# Patient Record
Sex: FEMALE | Race: BLACK OR AFRICAN AMERICAN
[De-identification: names, ages, dates, MRNs, and addresses within clinical notes are randomized per-mention and may not be internally consistent; named-entity substitution may affect disease eponyms.]

---
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
## 2020-05-27 ENCOUNTER — HOSPITAL ENCOUNTER (EMERGENCY)
Dept: HOSPITAL 97 - ER | Age: 20
LOS: 1 days | Discharge: HOME | End: 2020-05-28
Payer: COMMERCIAL

## 2020-05-27 DIAGNOSIS — H66.91: Primary | ICD-10-CM

## 2020-05-27 PROCEDURE — 87804 INFLUENZA ASSAY W/OPTIC: CPT

## 2020-05-27 PROCEDURE — 81025 URINE PREGNANCY TEST: CPT

## 2020-05-27 PROCEDURE — 87070 CULTURE OTHR SPECIMN AEROBIC: CPT

## 2020-05-27 PROCEDURE — 99283 EMERGENCY DEPT VISIT LOW MDM: CPT

## 2020-05-27 PROCEDURE — 93005 ELECTROCARDIOGRAM TRACING: CPT

## 2020-05-27 PROCEDURE — 81015 MICROSCOPIC EXAM OF URINE: CPT

## 2020-05-27 PROCEDURE — 71045 X-RAY EXAM CHEST 1 VIEW: CPT

## 2020-05-27 PROCEDURE — 96372 THER/PROPH/DIAG INJ SC/IM: CPT

## 2020-05-27 PROCEDURE — 81003 URINALYSIS AUTO W/O SCOPE: CPT

## 2020-05-27 PROCEDURE — 87086 URINE CULTURE/COLONY COUNT: CPT

## 2020-05-27 PROCEDURE — 87081 CULTURE SCREEN ONLY: CPT

## 2020-05-27 PROCEDURE — 87088 URINE BACTERIA CULTURE: CPT

## 2020-05-28 VITALS — OXYGEN SATURATION: 100 %

## 2020-05-28 VITALS — SYSTOLIC BLOOD PRESSURE: 136 MMHG | DIASTOLIC BLOOD PRESSURE: 70 MMHG | TEMPERATURE: 101.8 F

## 2020-05-28 LAB — UA COMPLETE W REFLEX CULTURE PNL UR: (no result)

## 2020-05-28 NOTE — RAD REPORT
EXAM DESCRIPTION:  Mayra Single View5/27/2020 10:36 pm

 

CLINICAL HISTORY:  Fever

 

COMPARISON:  2015

 

FINDINGS:   The lungs appear clear of acute infiltrate. The heart is normal size

 

IMPRESSION:   No acute abnormalities displayed

## 2020-05-30 NOTE — EKG
Test Date:    2020-05-27               Test Time:    22:49:49

Technician:   GIOVANI                                     

                                                     

MEASUREMENT RESULTS:                                       

Intervals:                                           

Rate:         107                                    

CO:           140                                    

QRSD:         80                                     

QT:           314                                    

QTc:          419                                    

Axis:                                                

P:            59                                     

CO:           140                                    

QRS:          58                                     

T:            46                                     

                                                     

INTERPRETIVE STATEMENTS:                                       

                                                     

Sinus tachycardia

Otherwise normal ECG

Compared to ECG 10/20/2015 20:27:50

Sinus rhythm no longer present



Electronically Signed On 05-30-20 13:03:28 CDT by Molina Silverman

## 2020-06-01 NOTE — ER
Nurse's Notes                                                                                     

 [facility]                                                                 

Name: Vivian Hull                                                                             

Age: 19 yrs                                                                                       

Sex: Female                                                                                       

: 2000                                                                                   

MRN: [id number]                                                                                   

Arrival Date: 2020                                                                          

Time: 21:36                                                                                       

Account#: [id number]                                                                            

Bed 20                                                                                            

Private MD:                                                                                       

Diagnosis: Fever, unspecified;Otitis media, unspecified, right ear                                

                                                                                                  

Presentation:                                                                                     

                                                                                             

21:49 Chief complaint: Patient states: "I've been having a fever and my tonsils are swollen   lp1 

      and I have a headache"; patient states headache since 20; Fever that began             

      yesterday, temp of 104 today; Last took Ibuprofen and tylenol at about 1400.                

      Coronavirus screen: Patient denies a cough. Patient denies shortness of breath or           

      difficulty breathing. Patient reports a measured and/or subjective temperature greater      

      than 100.4F. Patient denies travel on a cruise ship or to a country the [place] currently       

      lists as an affected area. Patient denies contact with known and/or suspected case of       

      COVID-19. Ebola Screen: No symptoms or risks identified at this time. Initial Sepsis        

      Screen: Does the patient meet any 2 criteria? Temp <36.0*C (96.8*F)) or > 38.3*C            

      (100.9*F). HR > 90 bpm. Does the patient have a suspected source of infection? Yes:         

      Other: Unknown. Risk Assessment: Do you want to hurt yourself or someone else? Patient      

      reports no desire to harm self or others. Note Patient states having a tooth fall out a     

      couple days ago, "a really bad cavity". Onset of symptoms was May 17, 2020.                 

21:49 Method Of Arrival: Ambulatory                                                           lp1 

21:49 Acuity: ARIELLE 3                                                                           lp1 

                                                                                                  

OB/GYN:                                                                                           

21:56 LMP N/A - Irregular menses                                                              lp1 

                                                                                                  

Historical:                                                                                       

- Allergies:                                                                                      

21:56 No Known Allergies;                                                                     lp1 

- Home Meds:                                                                                      

21:56 None [Active];                                                                          lp1 

- PMHx:                                                                                           

21:56 None;                                                                                   lp1 

- PSHx:                                                                                           

21:56 None;                                                                                   lp1 

                                                                                                  

- Immunization history:: Adult Immunizations up to date.                                          

- Social history:: Smoking status: Patient denies any tobacco usage or history of.                

                                                                                                  

                                                                                                  

Screenin/28                                                                                             

00:00 Abuse screen: Denies threats or abuse. Nutritional screening: No deficits noted.        vc  

      Tuberculosis screening: No symptoms or risk factors identified. Fall Risk None              

      identified.                                                                                 

                                                                                                  

Assessment:                                                                                       

                                                                                             

22:29 General: Appears uncomfortable, Behavior is anxious. Pain: Complains of pain in throat  ll1 

      Quality of pain is described as aching, Pain began 2-3 days ago. Neuro: No deficits         

      noted. Cardiovascular: No deficits noted. EENT: Throat is reddened has enlarged tonsils     

      Reports difficulty swallowing nasal congestion pain when swallowing in both ears.           

22:37 Reassessment: Spoke with patient's mother, Evelyn; Updated on plan of care for patient; lp1 

      Mother states she will call patient.                                                        

23:26 Reassessment: No changes from previously documented assessment. Patient and/or family   ll1 

      updated on plan of care and expected duration. Pain level reassessed. Patient is alert,     

      oriented x 3, equal unlabored respirations, skin warm/dry/pink.                             

23:57 Reassessment: No changes from previously documented assessment. Patient and/or family   ll1 

      updated on plan of care and expected duration. Pain level reassessed. Patient is alert,     

      oriented x 3, equal unlabored respirations, skin warm/dry/pink. Gait steady to restroom     

      for UA collection.                                                                          

                                                                                             

01:00 Reassessment: Patient appears in no apparent distress at this time. Patient and/or      vc  

      family updated on plan of care and expected duration. Pain level reassessed. Patient is     

      alert, oriented x 3, equal unlabored respirations, skin warm/dry/pink.                      

01:33 Reassessment: Patient appears in no apparent distress at this time. Patient and/or      vc  

      family updated on plan of care and expected duration. Pain level reassessed. Patient is     

      alert, oriented x 3, equal unlabored respirations, skin warm/dry/pink. Patient states       

      feeling better. Patient states symptoms have improved.                                      

                                                                                                  

Vital Signs:                                                                                      

                                                                                             

21:49  / 73; Pulse 109; Resp 18; Temp 103.7(O); Pulse Ox 100% on R/A; Weight 95.71 kg   lp1 

      (R); Height 5 ft. 7 in. (170.18 cm); Pain 9/10;                                             

22:30  / 83; Pulse 104; Resp 18; Pulse Ox 95% ;                                         ll1 

23:25  / 74; Pulse 102; Resp 17; Temp 103.2; Pulse Ox 100% ;                            ll1 

                                                                                             

01:33  / 70; Pulse 99; Resp 18; Temp 101.8; Pulse Ox 100% on R/A;                       vc  

                                                                                             

21:49 Body Mass Index 33.05 (95.71 kg, 170.18 cm)                                             lp1 

                                                                                                  

ED Course:                                                                                        

                                                                                             

21:36 Patient arrived in ED.                                                                  cf2 

21:53 [facility] Duran [facility]C [facility]                                                        snw 

21:53 Huber Benjamin MD is Attending Physician.                                           snw 

21:56 Triage completed.                                                                       lp1 

21:57 Prerna Felipe, RN is Primary Nurse.                                                     ll1 

21:57 Arm band placed on right wrist.                                                         lp1 

22:36 Chest Single View XRAY In Process Unspecified.                                          EDMS

                                                                                             

01:25 Primary Nurse role handed off by Prerna Felipe RN                                      vc  

01:25 Karla Joy RN is Primary Nurse.                                                  vc  

01:32 No provider procedures requiring assistance completed. Patient did not have IV access   vc  

      during this emergency room visit.                                                           

                                                                                                  

Administered Medications:                                                                         

                                                                                             

22:25 Drug: Tylenol 1000 mg Route: PO;                                                        ll1 

23:47 Follow up: Response: No adverse reaction; Temperature is decreased; RASS: Alert and     ll1 

      Calm (0)                                                                                    

                                                                                             

01:39 Follow up: Response: No adverse reaction; Temperature is decreased                      vc  

00:32 Drug: Bicillin L-A 1.2 million units Route: IM; Site: Ventrogluteal RIGHT;              vc  

01:34 Follow up: Response: No adverse reaction                                                vc  

00:41 Drug: Motrin 600 mg Route: PO;                                                          vc  

01:34 Follow up: Response: No adverse reaction                                                vc  

01:35 Not Given (Patient Refused): NS 0.9% (30 ml/kg) 30 ml/kg IV at bolus once; Sepsis       vc  

      Protocol                                                                                    

                                                                                                  

                                                                                                  

Outcome:                                                                                          

00:39 Discharge ordered by MD.                                                                snw 

01:32 Discharged to home                                                                      vc  

01:32 Condition: good                                                                             

01:32 Discharge instructions given to patient, Instructed on discharge instructions, follow       

      up and referral plans. Demonstrated understanding of instructions, follow-up care.          

01:35 Patient left the ED.                                                                    vc  

                                                                                                  

Signatures:                                                                                       

Dispatcher [employer]                           EDMS                                                 

Juliette Duran, REY-ISSAC                 FNP-Csnw                                                  

Nidia Hanks RN                         RN   lp1                                                  

Itzel Saunders                             cf2                                                  

Calcote, Karla, RN                    RN   vc                                                   

Matteo, Prerna, RN                       RN   ll1                                                  

                                                                                                  

**************************************************************************************************

## 2020-06-01 NOTE — EDPHYS
Physician Documentation                                                                           

 [facility]                                                                 

Name: Vivian Hull                                                                             

Age: 19 yrs                                                                                       

Sex: Female                                                                                       

: 2000                                                                                   

MRN: [id number]                                                                                   

Arrival Date: 2020                                                                          

Time: 21:36                                                                                       

Account#: [id number]                                                                            

Bed 20                                                                                            

Private MD:                                                                                       

ED Physician Huber Benjamin                                                                    

HPI:                                                                                              

                                                                                             

22:34 This 19 yrs old Black Female presents to ER via Ambulatory with complaints of Fever,    snw 

      Ear Pain, Sore Throat.                                                                      

22:34 The patient reports fever, that was measured at 104 degrees Fahrenheit. Onset: The      snw 

      symptoms/episode began/occurred gradually, 10 day(s) ago, and became worse today, fever     

      began today. Associated signs and symptoms: Pertinent positives: chills, earache, sore      

      throat. Severity of symptoms: At their worst the symptoms were moderate in the              

      emergency department the symptoms are unchanged. The patient has not experienced            

      similar symptoms in the past. recent tooth extraction.                                      

                                                                                                  

OB/GYN:                                                                                           

21:56 LMP N/A - Irregular menses                                                              lp1 

                                                                                                  

Historical:                                                                                       

- Allergies:                                                                                      

21:56 No Known Allergies;                                                                     lp1 

- Home Meds:                                                                                      

21:56 None [Active];                                                                          lp1 

- PMHx:                                                                                           

21:56 None;                                                                                   lp1 

- PSHx:                                                                                           

21:56 None;                                                                                   lp1 

                                                                                                  

- Immunization history:: Adult Immunizations up to date.                                          

- Social history:: Smoking status: Patient denies any tobacco usage or history of.                

                                                                                                  

                                                                                                  

ROS:                                                                                              

22:33 Eyes: Negative for injury, pain, redness, and discharge.                                snw 

22:33 Cardiovascular: Negative for chest pain, palpitations, and edema, Respiratory: Negative     

      for shortness of breath, cough, wheezing, and pleuritic chest pain, Abdomen/GI:             

      Negative for abdominal pain, nausea, vomiting, diarrhea, and constipation, Back:            

      Negative for injury and pain, : Negative for injury, bleeding, discharge, and             

      swelling, MS/Extremity: Negative for injury and deformity, Skin: Negative for injury,       

      rash, and discoloration, Neuro: Negative for headache, weakness, numbness, tingling,        

      and seizure, Psych: Negative for depression, anxiety, suicide ideation, homicidal           

      ideation, and hallucinations.                                                               

22:33 Constitutional: Positive for body aches, fatigue, fever.                                    

22:33 ENT: Positive for ear pain, sore throat.                                                    

22:33 Neck: Positive for swollen nodes, tenderness.                                               

                                                                                                  

Exam:                                                                                             

22:31 Head/Face:  Normocephalic, atraumatic. Eyes:  Pupils equal round and reactive to light, snw 

      extra-ocular motions intact.  Lids and lashes normal.  Conjunctiva and sclera are           

      non-icteric and not injected.  Cornea within normal limits.  Periorbital areas with no      

      swelling, redness, or edema.                                                                

22:31 Chest/axilla:  Normal chest wall appearance and motion.  Nontender with no deformity.       

      No lesions are appreciated.                                                                 

22:31 Respiratory:  Lungs have equal breath sounds bilaterally, clear to auscultation and         

      percussion.  No rales, rhonchi or wheezes noted.  No increased work of breathing, no        

      retractions or nasal flaring. Abdomen/GI:  Soft, non-tender, with normal bowel sounds.      

      No distension or tympany.  No guarding or rebound.  No evidence of tenderness               

      throughout. Back:  No spinal tenderness.  No costovertebral tenderness.  Full range of      

      motion. Skin:  Warm, dry with normal turgor.  Normal color with no rashes, no lesions,      

      and no evidence of cellulitis. MS/ Extremity:  Pulses equal, no cyanosis.                   

      Neurovascular intact.  Full, normal range of motion. Neuro:  Awake and alert, GCS 15,       

      oriented to person, place, time, and situation.  Cranial nerves II-XII grossly intact.      

      Motor strength 5/5 in all extremities.  Sensory grossly intact.  Cerebellar exam            

      normal.  Normal gait.                                                                       

22:31 Constitutional: The patient appears alert, awake, febrile.                                  

22:31 ENT: TM's: erythema, that is moderate, on the right, Examination of the other ear shows     

      no obvious abnormality, Nose: is normal, Mouth: is normal, Posterior pharynx: erythema,     

      that is mild, Voice: is normal.                                                             

22:31 Neck: Lymph nodes: lymphadenopathy is appreciated, anterior cervical nodes.                 

22:31 Cardiovascular: Rate: tachycardic, Rhythm: regular.                                         

22:31 Psych: Behavior/mood is cooperative, inappropriate for age, Affect is animated.             

                                                                                                  

Vital Signs:                                                                                      

21:49  / 73; Pulse 109; Resp 18; Temp 103.7(O); Pulse Ox 100% on R/A; Weight 95.71 kg   lp1 

      (R); Height 5 ft. 7 in. (170.18 cm); Pain 9/10;                                             

22:30  / 83; Pulse 104; Resp 18; Pulse Ox 95% ;                                         ll1 

23:25  / 74; Pulse 102; Resp 17; Temp 103.2; Pulse Ox 100% ;                            ll1 

                                                                                             

01:33  / 70; Pulse 99; Resp 18; Temp 101.8; Pulse Ox 100% on R/A;                       vc  

                                                                                             

21:49 Body Mass Index 33.05 (95.71 kg, 170.18 cm)                                             lp1 

                                                                                                  

MDM:                                                                                              

                                                                                             

22:12 Patient medically screened.                                                             snw 

                                                                                             

00:20 Data reviewed: vital signs, nurses notes. Data interpreted: Pulse oximetry: on room air snw 

      is 100 %. Interpretation: normal. Counseling: I had a detailed discussion with the          

      patient and/or guardian regarding: the historical points, exam findings, and any            

      diagnostic results supporting the discharge/admit diagnosis, lab results, radiology         

      results, the need for outpatient follow up, to return to the emergency department if        

      symptoms worsen or persist or if there are any questions or concerns that arise at          

      home. Refusal of service: The patient/guardian displays adequate decision making            

      capability and despite a detailed discussion of alternatives, benefits, risks, and          

      consequences refuses: pt refuses iv, ivf, will consent to Bicillin LA IM. EKG not           

      suggestive of endocarditis, flu and strep negative, but right otitis media. .               

00:40 Special discussion: Based on the history and exam findings, there is no indication for  snw 

      further emergent testing or inpatient evaluation. I discussed with the patient/guardian     

      the need to see the primary care provider for further evaluation of the symptoms.           

                                                                                                  

                                                                                             

22:04 Order name: Basic Metabolic Panel                                                       [facility] 

                                                                                             

22:04 Order name: Blood Culture Adult (2)                                                     w 

                                                                                             

22:04 Order name: CBC with Diff                                                               w 

                                                                                             

22:04 Order name: Urine Microscopic Only                                                      snw 

                                                                                             

22:04 Order name: Flu; Complete Time: 22:50                                                   snw 

                                                                                             

22:04 Order name: Strep; Complete Time: 22:50                                                 snw 

                                                                                             

22:04 Order name: Chest Single View XRAY                                                      [facility] 

                                                                                             

22:04 Order name: Cardiac monitoring; Complete Time: 23:46                                    snw 

                                                                                             

22:04 Order name: EKG - Nurse/Tech; Complete Time: 23:46                                      snw 

                                                                                             

22:04 Order name: O2 Per Protocol; Complete Time: 22:28                                       snw 

                                                                                             

22:04 Order name: O2 Sat Monitoring; Complete Time: 22:28                                     snw 

                                                                                             

23:07 Order name: Throat Culture                                                              [facility]

                                                                                             

00:08 Order name: Urine Dipstick--Ancillary (enter results)                                   [facility] 

                                                                                             

00:08 Order name: Urine Pregnancy--Ancillary (enter results)                                  [facility] 

                                                                                             

01:07 Order name: Urine Culture                                                               [facility]

                                                                                             

22:04 Order name: Urine Dipstick-Ancillary (obtain specimen); Complete Time: 23:47            snw 

                                                                                                  

EC/27                                                                                             

22:50 Rate is 107 beats/min. Rhythm is regular. QRS Axis is Normal. MS interval is normal.    snw 

      QRS interval is normal. QT interval is normal. No Q waves. T waves are Normal. Clinical     

      impression: Sinus tachycardia.                                                              

                                                                                                  

Administered Medications:                                                                         

22:25 Drug: Tylenol 1000 mg Route: PO;                                                        ll1 

23:47 Follow up: Response: No adverse reaction; Temperature is decreased; RASS: Alert and     ll1 

      Calm (0)                                                                                    

                                                                                             

01:39 Follow up: Response: No adverse reaction; Temperature is decreased                      vc  

00:32 Drug: Bicillin L-A 1.2 million units Route: IM; Site: Ventrogluteal RIGHT;              vc  

01:34 Follow up: Response: No adverse reaction                                                vc  

00:41 Drug: Motrin 600 mg Route: PO;                                                          vc  

01:34 Follow up: Response: No adverse reaction                                                vc  

01:35 Not Given (Patient Refused): NS 0.9% (30 ml/kg) 30 ml/kg IV at bolus once; Sepsis       vc  

      Protocol                                                                                    

                                                                                                  

                                                                                                  

Disposition:                                                                                      

06:41 Co-signature as Attending Physician, Huber Benjamin MD.                               ma2 

                                                                                                  

Disposition:                                                                                      

20 00:39 Discharged to Home. Impression: Fever, unspecified, Otitis media, unspecified,     

  right ear.                                                                                      

- Condition is Stable.                                                                            

- Discharge Instructions: Otitis Media, Adult, Fever, Adult, Rehydration, Adult.                  

                                                                                                  

- Medication Reconciliation Form, Thank You Letter, Antibiotic Education, Prescription            

  Opioid Use form.                                                                                

- Follow up: Emergency Department; When: As needed; Reason: Worsening of condition.               

  Follow up: Private Physician; When: 1 - 2 days; Reason: Recheck today's complaints,             

  Continuance of care, Re-evaluation by your physician.                                           

                                                                                                  

                                                                                                  

                                                                                                  

Signatures:                                                                                       

Dispatcher Jalousier                           [facility]                                                 

Juliette Duran, FNP-C                 FNP-Csnw                                                  

Nidia Hanks, RN                         RN   lp1                                                  

Huber Benjamin MD MD   ma2                                                  

Karla Joy RN RN vc Lewis, Lynsay, RN                       RN   ll1                                                  

                                                                                                  

Corrections: (The following items were deleted from the chart)                                    

                                                                                             

22:44 22:04 IV Saline Lock - Large Bore ordered. [place] [id number] 

:44 22:04 Labs collected and sent ordered. [place] [id number] 

23:46 22:04 Accucheck ordered. [place] [id number] 

                                                                                             

01:35 00:39 2020 00:39 Discharged to Home. Impression: Fever, unspecified; Otitis       vc  

      media, unspecified, right ear. Condition is Stable. Discharge Instructions: Otitis          

      Media, Adult, Rehydration, Adult, Fever, Adult. Forms are Medication Reconciliation         

      Form, Thank You Letter, Antibiotic Education, Prescription Opioid Use. Follow up:           

      Emergency Department; When: As needed; Reason: Worsening of condition. Follow up:           

      Private Physician; When: 1 - 2 days; Reason: Recheck today's complaints, Continuance of     

      care, Re-evaluation by your physician. snw                                                  

                                                                                                  

**************************************************************************************************

## 2020-07-02 ENCOUNTER — HOSPITAL ENCOUNTER (EMERGENCY)
Dept: HOSPITAL 97 - ER | Age: 20
Discharge: HOME | End: 2020-07-02
Payer: COMMERCIAL

## 2020-07-02 VITALS — TEMPERATURE: 98 F | SYSTOLIC BLOOD PRESSURE: 133 MMHG | DIASTOLIC BLOOD PRESSURE: 82 MMHG | OXYGEN SATURATION: 100 %

## 2020-07-02 DIAGNOSIS — B37.9: ICD-10-CM

## 2020-07-02 DIAGNOSIS — N39.0: Primary | ICD-10-CM

## 2020-07-02 PROCEDURE — 81003 URINALYSIS AUTO W/O SCOPE: CPT

## 2020-07-02 PROCEDURE — 99283 EMERGENCY DEPT VISIT LOW MDM: CPT

## 2020-07-02 NOTE — EDPHYS
Physician Documentation                                                                           

 [facility]                                                                 

Name: Vivian Hull                                                                             

Age: 19 yrs                                                                                       

Sex: Female                                                                                       

: 2000                                                                                   

MRN: [id number]                                                                                   

Arrival Date: 2020                                                                          

Time: 03:40                                                                                       

Account#: [id number]                                                                            

Bed 5                                                                                             

Private MD:                                                                                       

ED Physician Jose Rivera                                                                      

HPI:                                                                                              

                                                                                             

04:00 This 19 yrs old Black Female presents to ER via Ambulatory with complaints of Vaginal   jaycee 

      Pain.                                                                                       

04:00 The patient presents with vaginal discharge, that is a small amount of a moderate       jaycee 

      amount of curd-like, white discharge. Onset: The symptoms/episode began/occurred 3          

      day(s) ago. Modifying factors: The symptoms are alleviated by nothing, the symptoms are     

      aggravated by nothing. Associated signs and symptoms: The patient has no apparent           

      associated signs or symptoms. Severity of symptoms: At their worst the symptoms were        

      mild, in the emergency department the symptoms are unchanged. The patient is not            

      sexually active. The patient has not experienced similar symptoms in the past.              

                                                                                                  

OB/GYN:                                                                                           

03:56 LMP 6/15/2020                                                                           ea  

                                                                                                  

Historical:                                                                                       

- Allergies:                                                                                      

03:56 No Known Allergies;                                                                     ea  

- Home Meds:                                                                                      

03:56 None [Active];                                                                          ea  

- PMHx:                                                                                           

03:56 None;                                                                                   ea  

- PSHx:                                                                                           

03:56 None;                                                                                   ea  

                                                                                                  

- Immunization history:: Adult Immunizations unknown.                                             

- Social history:: Smoking status: Patient denies any tobacco usage or history of.                

- Family history:: not pertinent.                                                                 

                                                                                                  

                                                                                                  

ROS:                                                                                              

04:00 Constitutional: Negative for fever, chills, and weight loss, Eyes: Negative for injury, jaycee 

      pain, redness, and discharge, ENT: Negative for injury, pain, and discharge, Neck:          

      Negative for injury, pain, and swelling, Cardiovascular: Negative for chest pain,           

      palpitations, and edema, Respiratory: Negative for shortness of breath, cough,              

      wheezing, and pleuritic chest pain, Abdomen/GI: Negative for abdominal pain, nausea,        

      vomiting, diarrhea, and constipation, Back: Negative for injury and pain, MS/Extremity:     

      Negative for injury and deformity, Skin: Negative for injury, rash, and discoloration,      

      Neuro: Negative for headache, weakness, numbness, tingling, and seizure, Psych:             

      Negative for depression, anxiety, suicide ideation, homicidal ideation, and                 

      hallucinations, Allergy/Immunology: Negative for hives, rash, and allergies, Endocrine:     

      Negative for neck swelling, polydipsia, polyuria, polyphagia, and marked weight             

      changes, Hematologic/Lymphatic: Negative for swollen nodes, abnormal bleeding, and          

      unusual bruising.                                                                           

04:00 : Positive for vaginal discharge, vaginal itching.                                        

                                                                                                  

Exam:                                                                                             

04:00 Constitutional:  This is a well developed, well nourished patient who is awake, alert,  jaycee 

      and in no acute distress. Head/Face:  Normocephalic, atraumatic. Eyes:  Pupils equal        

      round and reactive to light, extra-ocular motions intact.  Lids and lashes normal.          

      Conjunctiva and sclera are non-icteric and not injected.  Cornea within normal limits.      

      Periorbital areas with no swelling, redness, or edema. ENT:  Nares patent. No nasal         

      discharge, no septal abnormalities noted.  Tympanic membranes are normal and external       

      auditory canals are clear.  Oropharynx with no redness, swelling, or masses, exudates,      

      or evidence of obstruction, uvula midline.  Mucous membranes moist. Neck:  Trachea          

      midline, no thyromegaly or masses palpated, and no cervical lymphadenopathy.  Supple,       

      full range of motion without nuchal rigidity, or vertebral point tenderness.  No            

      Meningismus. Chest/axilla:  Normal chest wall appearance and motion.  Nontender with no     

      deformity.  No lesions are appreciated. Cardiovascular:  Regular rate and rhythm with a     

      normal S1 and S2.  No gallops, murmurs, or rubs.  Normal PMI, no JVD.  No pulse             

      deficits. Respiratory:  Lungs have equal breath sounds bilaterally, clear to                

      auscultation and percussion.  No rales, rhonchi or wheezes noted.  No increased work of     

      breathing, no retractions or nasal flaring. Abdomen/GI:  Soft, non-tender, with normal      

      bowel sounds.  No distension or tympany.  No guarding or rebound.  No evidence of           

      tenderness throughout. Back:  No spinal tenderness.  No costovertebral tenderness.          

      Full range of motion. Skin:  Warm, dry with normal turgor.  Normal color with no            

      rashes, no lesions, and no evidence of cellulitis. MS/ Extremity:  Pulses equal, no         

      cyanosis.  Neurovascular intact.  Full, normal range of motion. Neuro:  Awake and           

      alert, GCS 15, oriented to person, place, time, and situation.  Cranial nerves II-XII       

      grossly intact.  Motor strength 5/5 in all extremities.  Sensory grossly intact.            

      Cerebellar exam normal.  Normal gait. Psych:  Awake, alert, with orientation to person,     

      place and time.  Behavior, mood, and affect are within normal limits.                       

04:00 : Pelvic Exam: the exam is deferred, na.                                                  

                                                                                                  

Vital Signs:                                                                                      

03:52  / 82; Pulse 83; Resp 18; Temp 98; Pulse Ox 100% on R/A; Weight 95.71 kg; Height  ea  

      5 ft. 7 in. (170.18 cm);                                                                    

03:52 Body Mass Index 33.05 (95.71 kg, 170.18 cm)                                             ea  

                                                                                                  

MDM:                                                                                              

03:49 Patient medically screened.                                                             [facility] 

04:03 Data reviewed: vital signs, nurses notes, lab test result(s), urinalysis.               [facility] 

04:10 Differential diagnosis: nonspecific abdominal pain, urinary tract infection, vaginosis. [facility] 

      Data interpreted: Cardiac monitor: not applicable for this patient encounter. rate is       

      83 beats/min, rhythm is regular, Pulse oximetry: on is 100 %. Test interpretation: by       

      ED physician or midlevel provider:. Counseling: I had a detailed discussion with the        

      patient and/or guardian regarding: the historical points, exam findings, and any            

      diagnostic results supporting the discharge/admit diagnosis, the need for outpatient        

      follow up, for definitive care, an OB/Gyne specialist. ED course: no fear of retention      

      of a tampoon.                                                                               

                                                                                                  

                                                                                             

04:09 Order name: Urine Dipstick--Ancillary (enter results)                                     

                                                                                             

04:00 Order name: Urine Dipstick-Ancillary (obtain specimen); Complete Time: 04:04            [facility] 

                                                                                             

04:00 Order name: Urine Pregnancy Test (obtain specimen); Complete Time: 04:04                [facility] 

                                                                                                  

Administered Medications:                                                                         

04:09 Drug: DiFLUcan 200 mg Route: PO;                                                        ea  

04:14 Follow up: Response: No adverse reaction; Medication administered at discharge.         mg2 

04:13 Drug: Cipro 500 mg Route: PO;                                                           mg2 

04:14 Follow up: Response: No adverse reaction; Medication administered at discharge.         mg2 

                                                                                                  

                                                                                                  

Disposition:                                                                                      

20 04:12 Discharged to Home. Impression: Candidiasis, Urinary tract infection, site not     

  specified.                                                                                      

- Condition is Stable.                                                                            

- Discharge Instructions: Dysuria, Urinary Tract Infection, Adult, Vaginal Yeast                  

  Infection, Adult, Urinary Tract Infection, Adult, Easy-to-Read, Antibiotic Medicine,            

  Adult, Antibiotic Medicine, Easy-to-Read.                                                       

- Prescriptions for Cipro 250 mg Oral Tablet - take 1 tablet by ORAL route every 12               

  hours for 7 days; 14 tablet. Diflucan 150 mg Oral Tablet - take 1 tablet by ORAL                

  route one time for 1 day every thursday; 2 tablet.                                              

- Medication Reconciliation Form, Thank You Letter, Antibiotic Education, Prescription            

  Opioid Use form.                                                                                

- Follow up: Private Physician; When: 2 - 3 days; Reason: Recheck today's complaints,             

  Continuance of care, Re-evaluation by your physician. Follow up: Ozzy Jasmine;                

  When: 2 - 3 days; Reason: Recheck today's complaints, Re-evaluation by your physician.          

- Problem is new.                                                                                 

- Symptoms have improved.                                                                         

                                                                                                  

                                                                                                  

                                                                                                  

Signatures:                                                                                       

Dispatcher [employer]                           EDJose Saldivar MD MD cha Antunez, Elena, Fred Gupta RN, ea, RN RN   mg2                                                  

                                                                                                  

Corrections: (The following items were deleted from the chart)                                    

04:21 04:12 2020 04:12 Discharged to Home. Impression: Candidiasis; Urinary tract       mg2 

      infection, site not specified. Condition is Stable. Forms are Medication Reconciliation     

      Form, Thank You Letter, Antibiotic Education, Prescription Opioid Use. Follow up:           

      Private Physician; When: 2 - 3 days; Reason: Recheck today's complaints, Continuance of     

      care, Re-evaluation by your physician. Follow up: Ozzy Jasmine; When: 2 - 3 days;         

      Reason: Recheck today's complaints, Re-evaluation by your physician. Problem is new.        

      Symptoms have improved. jaycee                                                                 

                                                                                                  

**************************************************************************************************

## 2020-07-02 NOTE — ER
Nurse's Notes                                                                                     

 [facility]                                                                 

Name: Vivian Hull                                                                             

Age: 19 yrs                                                                                       

Sex: Female                                                                                       

: 2000                                                                                   

MRN: [id number]                                                                                   

Arrival Date: 2020                                                                          

Time: 03:40                                                                                       

Account#: [id number]                                                                            

Bed 5                                                                                             

Private MD:                                                                                       

Diagnosis: Candidiasis;Urinary tract infection, site not specified                                

                                                                                                  

Presentation:                                                                                     

                                                                                             

03:52 Chief complaint: Patient states: Reports she had a weird discharge that started a few   ea  

      days ago that looked clumpy. Pt reports at 0315 this AM she took monistat and it            

      started burning. Coronavirus screen: Proceed with normal triage. Ebola Screen: No           

      symptoms or risks identified at this time. Initial Sepsis Screen: Does the patient meet     

      any 2 criteria? No. Patient's initial sepsis screen is negative. Does the patient have      

      a suspected source of infection? No. Patient's initial sepsis screen is negative. Risk      

      Assessment: Do you want to hurt yourself or someone else? Patient reports no desire to      

      harm self or others. Onset of symptoms was 2020.                                   

03:52 Method Of Arrival: Ambulatory                                                           ea  

03:52 Acuity: ARIELLE 3                                                                           ea  

03:52 Acuity: ARIELLE 4                                                                           ea  

                                                                                                  

OB/GYN:                                                                                           

03:56 LMP 6/15/2020                                                                           ea  

                                                                                                  

Historical:                                                                                       

- Allergies:                                                                                      

03:56 No Known Allergies;                                                                     ea  

- Home Meds:                                                                                      

03:56 None [Active];                                                                          ea  

- PMHx:                                                                                           

03:56 None;                                                                                   ea  

- PSHx:                                                                                           

03:56 None;                                                                                   ea  

                                                                                                  

- Immunization history:: Adult Immunizations unknown.                                             

- Social history:: Smoking status: Patient denies any tobacco usage or history of.                

- Family history:: not pertinent.                                                                 

                                                                                                  

                                                                                                  

Screenin:52 Abuse screen: Denies threats or abuse. Nutritional screening: No deficits noted.        ea  

      Tuberculosis screening: No symptoms or risk factors identified. Fall Risk None              

      identified.                                                                                 

                                                                                                  

Assessment:                                                                                       

04:14 General: Appears in no apparent distress. comfortable, Behavior is calm, cooperative.   mg2 

      Pain: Complains of pain in vaginal area. Neuro: Level of Consciousness is awake, alert,     

      obeys commands, Oriented to person, place, time, Appropriate for age. Cardiovascular:       

      Capillary refill < 3 seconds Patient's skin is warm and dry. Respiratory: Airway is         

      patent Respiratory effort is even, unlabored, Respiratory pattern is regular,               

      symmetrical. GI: No signs and/or symptoms were reported involving the gastrointestinal      

      system. : Reports discharge, from vagina that is white, pain vaginal area. EENT: No       

      signs and/or symptoms were reported regarding the EENT system. Derm: Skin is intact, is     

      healthy with good turgor, Skin is pink, warm \T\ dry. normal. Musculoskeletal:              

      Circulation, motion, and sensation intact. Capillary refill < 3 seconds.                    

                                                                                                  

Vital Signs:                                                                                      

03:52  / 82; Pulse 83; Resp 18; Temp 98; Pulse Ox 100% on R/A; Weight 95.71 kg; Height  ea  

      5 ft. 7 in. (170.18 cm);                                                                    

03:52 Body Mass Index 33.05 (95.71 kg, 170.18 cm)                                             ea  

                                                                                                  

ED Course:                                                                                        

03:40 Patient arrived in ED.                                                                  bp1 

03:49 Jose Rivera MD is Attending Physician.                                             jaycee 

03:49 Fred Vasquez, RN is Primary Nurse.                                                  mg2 

03:55 Triage completed.                                                                       ea  

03:55 Arm band placed on right wrist. Patient placed in an exam room, on a stretcher, on      ea  

      pulse oximetry.                                                                             

03:55 Patient has correct armband on for positive identification. Bed in low position. Call   ea  

      light in reach. Side rails up X2.                                                           

04:12 Ozzy Jasmine MD is Referral Physician.                                              jaycee 

04:15 No provider procedures requiring assistance completed. Patient did not have IV access   mg2 

      during this emergency room visit.                                                           

                                                                                                  

Administered Medications:                                                                         

04:09 Drug: DiFLUcan 200 mg Route: PO;                                                        ea  

04:14 Follow up: Response: No adverse reaction; Medication administered at discharge.         mg2 

04:13 Drug: Cipro 500 mg Route: PO;                                                           mg2 

04:14 Follow up: Response: No adverse reaction; Medication administered at discharge.         mg2 

                                                                                                  

                                                                                                  

Outcome:                                                                                          

04:12 Discharge ordered by MD.                                                                jaycee 

04:20 Discharged to home ambulatory.                                                          mg2 

04:20 Condition: stable                                                                           

04:20 Discharge instructions given to patient, Instructed on discharge instructions, follow       

      up and referral plans. medication usage, Demonstrated understanding of instructions,        

      follow-up care, medications, Prescriptions given X 2.                                       

04:21 Patient left the ED.                                                                    mg2 

                                                                                                  

Signatures:                                                                                       

Jose Rivera MD MD cha Antunez, Elena, RN RN ea Gardose, Michele, RN RN   mg2                                                  

Tawny Kiser                           bp1                                                  

                                                                                                  

Corrections: (The following items were deleted from the chart)                                    

04:20 04:14 : Reports pain vaginal area mg2                                                 mg2 

                                                                                                  

**************************************************************************************************

## 2021-06-09 ENCOUNTER — HOSPITAL ENCOUNTER (EMERGENCY)
Dept: HOSPITAL 97 - ER | Age: 21
Discharge: HOME | End: 2021-06-09
Payer: COMMERCIAL

## 2021-06-09 VITALS — TEMPERATURE: 97.9 F | SYSTOLIC BLOOD PRESSURE: 144 MMHG | OXYGEN SATURATION: 100 % | DIASTOLIC BLOOD PRESSURE: 80 MMHG

## 2021-06-09 DIAGNOSIS — S00.90XA: Primary | ICD-10-CM
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
## 2021-06-09 PROCEDURE — 99283 EMERGENCY DEPT VISIT LOW MDM: CPT
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
## 2022-08-04 ENCOUNTER — HOSPITAL ENCOUNTER (EMERGENCY)
Dept: HOSPITAL 97 - ER | Age: 22
Discharge: HOME | End: 2022-08-04
Payer: SELF-PAY

## 2022-08-04 VITALS — DIASTOLIC BLOOD PRESSURE: 73 MMHG | OXYGEN SATURATION: 100 % | SYSTOLIC BLOOD PRESSURE: 130 MMHG | TEMPERATURE: 98.3 F

## 2022-08-04 DIAGNOSIS — N94.6: Primary | ICD-10-CM

## 2022-08-04 LAB
ALBUMIN SERPL BCP-MCNC: 3.8 G/DL (ref 3.4–5)
ALP SERPL-CCNC: 78 U/L (ref 45–117)
ALT SERPL W P-5'-P-CCNC: 13 U/L (ref 12–78)
AST SERPL W P-5'-P-CCNC: 12 U/L (ref 15–37)
BUN BLD-MCNC: 9 MG/DL (ref 7–18)
GLUCOSE SERPLBLD-MCNC: 91 MG/DL (ref 74–106)
HCT VFR BLD CALC: 39.5 % (ref 36–45)
LIPASE SERPL-CCNC: 77 U/L (ref 73–393)
LYMPHOCYTES # SPEC AUTO: 2.9 K/UL (ref 0.7–4.9)
MCV RBC: 79.1 FL (ref 80–100)
PMV BLD: 7.6 FL (ref 7.6–11.3)
POTASSIUM SERPL-SCNC: 4 MMOL/L (ref 3.5–5.1)
RBC # BLD: 4.99 M/UL (ref 3.86–4.86)
SP GR UR: 1.02 (ref 1–1.03)

## 2022-08-04 PROCEDURE — 36415 COLL VENOUS BLD VENIPUNCTURE: CPT

## 2022-08-04 PROCEDURE — 80053 COMPREHEN METABOLIC PANEL: CPT

## 2022-08-04 PROCEDURE — 83690 ASSAY OF LIPASE: CPT

## 2022-08-04 PROCEDURE — 96375 TX/PRO/DX INJ NEW DRUG ADDON: CPT

## 2022-08-04 PROCEDURE — 81025 URINE PREGNANCY TEST: CPT

## 2022-08-04 PROCEDURE — 81003 URINALYSIS AUTO W/O SCOPE: CPT

## 2022-08-04 PROCEDURE — 85025 COMPLETE CBC W/AUTO DIFF WBC: CPT

## 2022-08-04 PROCEDURE — 96374 THER/PROPH/DIAG INJ IV PUSH: CPT

## 2022-08-04 PROCEDURE — 99284 EMERGENCY DEPT VISIT MOD MDM: CPT

## 2022-08-04 PROCEDURE — 74177 CT ABD & PELVIS W/CONTRAST: CPT

## 2022-08-04 NOTE — ER
Nurse's Notes                                                                                     

 [facility]                                                                 

Name: Vivian Hull                                                                             

Age: 21 yrs                                                                                       

Sex: Female                                                                                       

: 2000                                                                                   

MRN: [id number]                                                                                   

Arrival Date: 2022                                                                          

Time: 17:39                                                                                       

Account#: [id number]                                                                            

Bed 24                                                                                            

Private MD:                                                                                       

Diagnosis: Dysmenorrhea, unspecified                                                              

                                                                                                  

Presentation:                                                                                     

                                                                                             

17:51 Chief complaint: Patient states: ABD cramping since this morning - nausea. Reports      ld1 

      menstrual cycle began this morning. Coronavirus screen: At this time, the client does       

      not indicate any symptoms associated with coronavirus-19. Ebola Screen: No symptoms or      

      risks identified at this time. Initial Sepsis Screen: Does the patient meet any 2           

      criteria? No. Patient's initial sepsis screen is negative. Does the patient have a          

      suspected source of infection? No. Patient's initial sepsis screen is negative. Risk        

      Assessment: Do you want to hurt yourself or someone else? Patient reports no desire to      

      harm self or others. Onset of symptoms was 2022 at 17:53.                        

17:51 Method Of Arrival: Ambulatory                                                           ld1 

17:51 Acuity: ARIELLE 3                                                                           ld1 

                                                                                                  

Triage Assessment:                                                                                

17:53 General: Appears in no apparent distress. uncomfortable, Behavior is cooperative,       ld1 

      anxious. Pain: Complains of pain in right lower quadrant and left lower quadrant Pain       

      does not radiate. Pain currently is 9 out of 10 on a pain scale. EENT: No signs and/or      

      symptoms were reported regarding the EENT system. Neuro: Level of Consciousness is          

      awake, alert, obeys commands, Oriented to person, place, time, situation.                   

      Cardiovascular: Capillary refill < 3 seconds Patient's skin is warm and dry.                

      Respiratory: Airway is patent Respiratory effort is even, unlabored. GI: Abdomen is         

      round non-distended, Reports cramping. : Urine is clear, Reports vaginal bleeding         

      that is. Derm: No signs and/or symptoms reported regarding the dermatologic system.         

      Musculoskeletal: No signs and/or symptoms reported regarding the musculoskeletal system.    

                                                                                                  

OB/GYN:                                                                                           

17:53 LMP 2022                                                                            ld1 

                                                                                                  

Historical:                                                                                       

- Allergies:                                                                                      

18:25 No Known Allergies;                                                                     hb  

- Home Meds:                                                                                      

17:53 None [Active];                                                                          ld1 

- PMHx:                                                                                           

17:53 None;                                                                                   ld1 

- PSHx:                                                                                           

17:53 None;                                                                                   ld1 

                                                                                                  

- Immunization history:: Adult Immunizations up to date, Client reports having NOT                

  received the Covid vaccine.                                                                     

- Social history:: Smoking status: Patient denies any tobacco usage or history of.                

  Patient/guardian denies using alcohol.                                                          

                                                                                                  

                                                                                                  

Screenin:34 Abuse screen: Denies threats or abuse. Denies injuries from another. Nutritional        hb  

      screening: No deficits noted. Tuberculosis screening: No symptoms or risk factors           

      identified. Fall Risk None identified.                                                      

                                                                                                  

Assessment:                                                                                       

18:25 General: Appears in no apparent distress. Behavior is calm, cooperative. Pain: Pain     hb  

      currently is 8 out of 10 on a pain scale. Neuro: Level of Consciousness is awake,           

      alert, obeys commands, Oriented to person, place, time, situation. Cardiovascular:          

      Patient's skin is warm and dry. Respiratory: Respiratory effort is even, unlabored,         

      Respiratory pattern is regular, symmetrical. GI: Reports lower abdominal pain. :          

      Reports vaginal bleeding that is. EENT: No signs and/or symptoms were reported              

      regarding the EENT system. Derm: Skin is pink, warm \T\ dry. Musculoskeletal: No signs      

      and/or symptoms reported regarding the musculoskeletal system.                              

20:34 Reassessment: Patient appears in no apparent distress at this time. No changes from     hb  

      previously documented assessment. Patient and/or family updated on plan of care and         

      expected duration. Pain level reassessed. Patient is alert, oriented x 3, equal             

      unlabored respirations, skin warm/dry/pink.                                                 

                                                                                                  

Vital Signs:                                                                                      

17:51  / 73; Pulse 73; Resp 18; Temp 98.3(TE); Pulse Ox 100% on R/A; Weight 97.52 kg;   ld1 

      Height 5 ft. 7 in. (170.18 cm); Pain 9/10;                                                  

17:51 Body Mass Index 33.67 (97.52 kg, 170.18 cm)                                             ld1 

                                                                                                  

ED Course:                                                                                        

17:39 Patient arrived in ED.                                                                  rg4 

17:41 [facility]                                                        kb  

17:41 Carlos Parks MD is Attending Physician.                                              kb  

17:53 Triage completed.                                                                       ld1 

17:53 Arm band placed on right wrist.                                                         ld1 

17:59 Abbie Galo, RN is Primary Nurse.                                                   hb  

18:14 Inserted saline lock: 22 gauge in right antecubital area, using aseptic technique.      zm  

      Blood collected.                                                                            

18:15 CBC with Diff Sent.                                                                     zm  

18:15 CMP Sent.                                                                               zm  

18:15 Lipase Sent.                                                                            zm  

19:21 CT Abd/Pelvis - IV Contrast Only In Process Unspecified.                                EDMS

19:36 Bed in low position. Call light in reach. Side rails up X2. Assisted to bathroom.       3 

20:34 No provider procedures requiring assistance completed. IV discontinued, intact,         hb  

      bleeding controlled, No redness/swelling at site.                                           

                                                                                                  

Administered Medications:                                                                         

18:25 Drug: NS 0.9% 1000 ml Route: IV; Rate: 1 bolus; Site: right antecubital;                hb  

18:25 Drug: Zofran (Ondansetron) 4 mg Route: IVP; Site: right antecubital;                    hb  

18:25 Drug: Ketorolac 15 mg Route: IVP; Site: right antecubital;                              hb  

                                                                                                  

                                                                                                  

Medication:                                                                                       

20:34 VIS not applicable for this client.                                                     hb  

                                                                                                  

Outcome:                                                                                          

20:09 Discharge ordered by MD.                                                                kb  

20:10 Discharge ordered by MD.                                                                kb  

20:34 Discharged to home ambulatory.                                                          hb  

20:34 Condition: stable                                                                           

20:34 Discharge instructions given to patient, Instructed on discharge instructions, follow       

      up and referral plans. medication usage, Demonstrated understanding of instructions,        

      follow-up care, medications.                                                                

20:35 Patient left the ED.                                                                    hb  

                                                                                                  

Signatures:                                                                                       

Dispatcher [employer]                           EDMS                                                 

Senait Dwyer, FNP-C                 FNP-CkAbbie Coronado, RN                     RN   Minal Rudolph                                 4                                                  

Rosaura Johnson RN                     RN   1                                                  

Lali Mulligan                                   3                                                  

Diandra Wyatt                                                                                 

                                                                                                  

**************************************************************************************************

## 2022-08-04 NOTE — EDPHYS
Physician Documentation                                                                           

 [facility]                                                                 

Name: Vivian Hull                                                                             

Age: 21 yrs                                                                                       

Sex: Female                                                                                       

: 2000                                                                                   

MRN: [id number]                                                                                   

Arrival Date: 2022                                                                          

Time: 17:39                                                                                       

Account#: [id number]                                                                            

Bed 24                                                                                            

Private MD:                                                                                       

ED Physician Carlos Parks                                                                       

HPI:                                                                                              

                                                                                             

00:30 This 21 yrs old Black Female presents to ER via Ambulatory with complaints of Abdominal kb  

      Cramping, Vaginal Bleeding.                                                                 

00:30 The patient presents with abdominal pain in the lower abdomen. Onset: The               kb  

      symptoms/episode began/occurred this morning. The symptoms do not radiate. Associated       

      signs and symptoms: Pertinent positives: vaginal bleeding. The symptoms are described       

      as constant. Modifying factors: The symptoms are alleviated by nothing, the symptoms        

      are aggravated by nothing. Severity of pain: At its worst the pain was moderate in the      

      emergency department the pain is unchanged. The patient has experienced similar             

      episodes in the past, but today's symptoms are worse, more painful. The patient has not     

      recently seen a physician. Patient reports lower abdominal cramping that started this       

      morning is gotten worse throughout the day. States she normally has menstrual cramps        

      but this feels different and is a lot worse..                                               

                                                                                                  

OB/GYN:                                                                                           

                                                                                             

17:53 LMP 2022                                                                            ld1 

                                                                                                  

Historical:                                                                                       

- Allergies:                                                                                      

18:25 No Known Allergies;                                                                     hb  

- Home Meds:                                                                                      

17:53 None [Active];                                                                          ld1 

- PMHx:                                                                                           

17:53 None;                                                                                   ld1 

- PSHx:                                                                                           

17:53 None;                                                                                   ld1 

                                                                                                  

- Immunization history:: Adult Immunizations up to date, Client reports having NOT                

  received the Covid vaccine.                                                                     

- Social history:: Smoking status: Patient denies any tobacco usage or history of.                

  Patient/guardian denies using alcohol.                                                          

                                                                                                  

                                                                                                  

ROS:                                                                                              

                                                                                             

00:28 Constitutional: Negative for fever, chills, and weight loss.                            kb  

      Abdomen/GI: Positive for abdominal cramps, Negative for nausea, vomiting, and diarrhea.     

      : Positive for vaginal bleeding.                                                          

      All other systems are negative.                                                             

                                                                                                  

Exam:                                                                                             

00:29 Constitutional:  This is a well developed, well nourished patient who is awake, alert,  kb  

      and in no acute distress. Head/Face:  Normocephalic, atraumatic. ENT:  Moist Mucous         

      membranes Cardiovascular:  Regular rate and rhythm with a normal S1 and S2.  No             

      gallops, murmurs, or rubs.  No pulse deficits. Respiratory:  Respirations even and          

      unlabored. No increased work of breathing. Talking in full sentences Skin:  Warm, dry       

      with normal turgor.  Normal color. MS/ Extremity:  Pulses equal, no cyanosis.               

      Neurovascular intact.  Full, normal range of motion. Neuro:  Awake and alert, GCS 15,       

      oriented to person, place, time, and situation. Moves all extremities. Normal gait.         

      Psych:  Awake, alert, with orientation to person, place and time.  Behavior, mood, and      

      affect are within normal limits.                                                            

00:29 Abdomen/GI: Inspection: abdomen appears normal, Bowel sounds: normal, Palpation: soft,      

      in all quadrants, mild abdominal tenderness, in the suprapubic area and left lower          

      quadrant, moderate abdominal tenderness, in the right lower quadrant.                       

                                                                                                  

Vital Signs:                                                                                      

                                                                                             

17:51  / 73; Pulse 73; Resp 18; Temp 98.3(TE); Pulse Ox 100% on R/A; Weight 97.52 kg;   ld1 

      Height 5 ft. 7 in. (170.18 cm); Pain 9/10;                                                  

17:51 Body Mass Index 33.67 (97.52 kg, 170.18 cm)                                             ld1 

                                                                                                  

MDM:                                                                                              

17:53 Patient medically screened.                                                             kb  

                                                                                             

00:29 Data reviewed: vital signs, nurses notes. Data interpreted: Pulse oximetry: on room air kb  

      is 100 %. Interpretation: normal. Counseling: I had a detailed discussion with the          

      patient and/or guardian regarding: the historical points, exam findings, and any            

      diagnostic results supporting the discharge/admit diagnosis, lab results, radiology         

      results, the need for outpatient follow up, a family practitioner, to return to the         

      emergency department if symptoms worsen or persist or if there are any questions or         

      concerns that arise at home.                                                                

                                                                                                  

                                                                                             

17:54 Order name: CBC with Diff; Complete Time: 18:25                                         kb  

                                                                                             

17:54 Order name: CMP; Complete Time: 18:48                                                   kb  

                                                                                             

17:54 Order name: Lipase; Complete Time: 18:48                                                kb  

                                                                                             

17:54 Order name: CT Abd/Pelvis - IV Contrast Only; Complete Time: 19:40                      kb  

                                                                                             

18:21 Order name: Urine Dipstick-Ancillary; Complete Time: 18:25                              EDMS

                                                                                             

18:43 Order name: Urine Pregnancy--Ancillary (enter results); Complete Time: 18:59            kj1 

                                                                                             

17:41 Order name: Urine Dipstick-Ancillary (obtain specimen); Complete Time: 18:15            kb  

                                                                                             

17:41 Order name: Urine Pregnancy Test (obtain specimen); Complete Time: 18:15                kb  

                                                                                             

17:54 Order name: IV Saline Lock; Complete Time: 18:15                                        kb  

                                                                                             

17:54 Order name: Labs collected and sent; Complete Time: 18:15                               kb  

                                                                                                  

Administered Medications:                                                                         

                                                                                             

18:25 Drug: NS 0.9% 1000 ml Route: IV; Rate: 1 bolus; Site: right antecubital;                hb  

18:25 Drug: Zofran (Ondansetron) 4 mg Route: IVP; Site: right antecubital;                    hb  

18:25 Drug: Ketorolac 15 mg Route: IVP; Site: right antecubital;                              hb  

                                                                                                  

                                                                                                  

Disposition:                                                                                      

                                                                                             

09:10 Co-signature as Attending Physician, Carlos Parks MD I agree with the assessment and   kdr 

      plan of care.                                                                               

                                                                                                  

Disposition Summary:                                                                              

22 20:10                                                                                    

Discharge Ordered                                                                                 

      Location: Home(22 20:10)                                                          kb  

      Condition: Stable(22 20:10)                                                       kb  

      Diagnosis                                                                                   

        - Dysmenorrhea, unspecified                                                           kb  

      Followup:                                                                               kb  

        - With: Emergency Department                                                               

        - When: As needed                                                                          

        - Reason: Worsening of condition                                                           

      Followup:                                                                               kb  

        - With: Private Physician                                                                  

        - When: 2 - 3 days                                                                         

        - Reason: Recheck today's complaints, Continuance of care, Re-evaluation by your           

      physician                                                                                   

      Discharge Instructions:                                                                     

        - Discharge Summary Sheet                                                             kb  

        - Dysmenorrhea, Easy-to-Read                                                          kb  

      Forms:                                                                                      

        - Medication Reconciliation Form                                                      kb  

        - Thank You Letter                                                                    kb  

        - Antibiotic Education                                                                kb  

        - Prescription Opioid Use                                                             kb  

Signatures:                                                                                       

Dispatcher [employer]                           EDSenait Mejia, FNP-C                 REY-Carlos Linder MD MD   [facility]                                                  

Abbie Galo RN                     RN                                                      

Rosaura Johnson, EVANGELIST                     RN   ld1                                                  

                                                                                                  

Corrections: (The following items were deleted from the chart)                                    

                                                                                             

20:09 20:09 Home kb                                                                           kb  

20:09 20:09 Stable kb                                                                         kb  

20:09 20:09 Lower abdominal pain, unspecified kb                                              kb  

                                                                                                  

**************************************************************************************************

## 2023-10-11 ENCOUNTER — HOSPITAL ENCOUNTER (EMERGENCY)
Dept: HOSPITAL 97 - ER | Age: 23
Discharge: HOME | End: 2023-10-11
Payer: SELF-PAY

## 2023-10-11 VITALS — OXYGEN SATURATION: 99 % | TEMPERATURE: 99.4 F | DIASTOLIC BLOOD PRESSURE: 73 MMHG | SYSTOLIC BLOOD PRESSURE: 132 MMHG

## 2023-10-11 DIAGNOSIS — R07.0: Primary | ICD-10-CM

## 2023-10-11 PROCEDURE — 87070 CULTURE OTHR SPECIMN AEROBIC: CPT

## 2023-10-11 PROCEDURE — 87081 CULTURE SCREEN ONLY: CPT

## 2023-10-11 NOTE — EDPHYS
Physician Documentation                                                                           

 [facility]                                                                 

Name: Vivian Hull                                                                             

Age: 23 yrs                                                                                       

Sex: Female                                                                                       

: 2000                                                                                   

MRN: [id number]                                                                                   

Arrival Date: 10/11/2023                                                                          

Time: 01:54                                                                                       

Account#: [id number]                                                                            

Bed 14                                                                                            

Private MD:                                                                                       

ED Physician Jose Rivera                                                                      

HPI:                                                                                              

10/11                                                                                             

02:11 This 23 yrs old Black Female presents to ER via Ambulatory with complaints of THROAT    kb  

      PAIN.                                                                                       

02:11 The patient presents with sore throat. The patient describes throat pain as constant.   kb  

      Onset: The symptoms/episode began/occurred 1 hour(s) ago. Severity of symptoms: At          

      their worst the symptoms were mild, in the emergency department the symptoms are            

      unchanged. Modifying factors: The symptoms are alleviated by nothing, the symptoms are      

      aggravated by swallowing, Patient's oral intake status: good. Associated signs and          

      symptoms: Pertinent positives: Sore throat Pertinent negatives fever. The patient has       

      experienced similar episodes in the past, several times. The patient has not recently       

      seen a physician.                                                                           

                                                                                                  

OB/GYN:                                                                                           

02:05 LMP 2023, Pregnancy unknown                                                        cm10

                                                                                                  

Historical:                                                                                       

- Allergies:                                                                                      

02:05 No Known Allergies;                                                                     cm10

- Home Meds:                                                                                      

02:05 None [Active];                                                                          cm10

- PMHx:                                                                                           

02:05 None;                                                                                   cm10

- PSHx:                                                                                           

02:05 None;                                                                                   cm10

                                                                                                  

- Immunization history:: Adult Immunizations unknown.                                             

- Social history:: Smoking status: Reported history of juuling and/or vaping. Patient             

  uses street drugs, marijuana.                                                                   

                                                                                                  

                                                                                                  

ROS:                                                                                              

02:10 Constitutional: Negative for fever, chills, and weight loss,                            kb  

02:10 ENT: Positive for sore throat,                                                              

02:10 All other systems are negative,                                                             

                                                                                                  

Exam:                                                                                             

02:10 Constitutional:  This is a well developed, well nourished patient who is awake, alert,  kb  

      and in no acute distress. Head/Face:  Normocephalic, atraumatic. Cardiovascular:            

      Regular rate  Respiratory:  Respirations even and unlabored. No increased work of           

      breathing. Talking in full sentences Skin:  Warm, dry with normal turgor.  Normal           

      color. MS/ Extremity:  Pulses equal, no cyanosis.  Neurovascular intact.  Full, normal      

      range of motion. Neuro:  Awake and alert, GCS 15, oriented to person, place, time, and      

      situation. Moves all extremities. Normal gait.                                              

02:10 ENT: Posterior pharynx: Tonsils: bilaterally enlarged, with erythema,                       

                                                                                                  

Vital Signs:                                                                                      

02:04  / 73; Pulse 81; Resp 16 S; Temp 99.4(O); Pulse Ox 99% on R/A; Weight 96.62 kg    cm10

      (R); Height 5 ft. 7 in. (R); Pain 4/10;                                                     

02:04 Body Mass Index 33.36 (96.62 kg, 170.18 cm)                                             cm10

02:04 Pain Scale: Adult                                                                       cm10

                                                                                                  

MDM:                                                                                              

02:00 Patient medically screened.                                                             kb  

02:11 Data reviewed: vital signs, nurses notes.                                               kb  

02:52 Differential diagnosis: peritonsillar abscess pharyngitis, tonsillitis. Test considered kb  

      but Not performed: CT: CT considered, but no trismus, able to visualize tonsils, no         

      abscess seen. Counseling: I had a detailed discussion with the patient and/or guardian      

      regarding the historical points, exam findings, and any diagnostic results supporting       

      the discharge/admit diagnosis, lab results, the need for outpatient follow up, a family     

      practitioner, to return to the emergency department if symptoms worsen or persist or if     

      there are any questions or concerns that arise at home.                                     

                                                                                                  

10/11                                                                                             

02:02 Order name: Strep                                                                       kb  

10/11                                                                                             

02:54 Order name: Throat Culture                                                              EDMS

                                                                                                  

Administered Medications:                                                                         

No medications were administered                                                                  

                                                                                                  

                                                                                                  

Disposition Summary:                                                                              

10/11/23 02:53                                                                                    

Discharge Ordered                                                                                 

 Notes:       Location: Home                                                                        
  kb

      Condition: Stable                                                                       kb  

      Diagnosis                                                                                   

        - Pain in throat                                                                      kb  

      Followup:                                                                               kb  

        - With: Emergency Department                                                               

        - When: As needed                                                                          

        - Reason: Worsening of condition                                                           

      Followup:                                                                               kb  

        - With: Private Physician                                                                  

        - When: 2 - 3 days                                                                         

        - Reason: Recheck today's complaints, Continuance of care, Re-evaluation by your           

      physician                                                                                   

      Discharge Instructions:                                                                     

        - Discharge Summary Sheet                                                             kb  

        - Sore Throat, Easy-to-Read                                                           kb  

      Forms:                                                                                      

        - Medication Reconciliation Form                                                      kb  

        - Thank You Letter                                                                    kb  

        - Antibiotic Education                                                                kb  

        - Prescription Opioid Use                                                             kb  

        - Patient Portal Instructions                                                         kb  

        - Leadership Thank You Letter                                                         kb  

Signatures:                                                                                       

Dispatcher [employer]                           Senait Suarez, REY-ISSAC LE-Eulalia Miranda, RN                  RN   cm10                                                 

                                                                                                  

**************************************************************************************************

## 2023-10-11 NOTE — ER
Nurse's Notes                                                                                     

 [facility]                                                                 

Name: Vivian Hull                                                                             

Age: 23 yrs                                                                                       

Sex: Female                                                                                       

: 2000                                                                                   

MRN: [id number]                                                                                   

Arrival Date: 10/11/2023                                                                          

Time: 01:54                                                                                       

Account#: [id number]                                                                            

Bed 14                                                                                            

Private MD:                                                                                       

Diagnosis: Pain in throat                                                                         

                                                                                                  

Presentation:                                                                                     

10/11                                                                                             

02:04 Chief complaint: Patient states: left sided throat pain onset 1hr PTA. pt states that   cm10

      she started having pain while eating. No fevers. Coronavirus screen: Vaccine status:        

      Patient reports being unvaccinated. Client denies travel out of the [place] in the last 14     

      days. Ebola Screen: Patient denies travel to an Ebola-affected area in the 21 days          

      before illness onset. No symptoms or risks identified at this time. Initial Sepsis          

      Screen: Does the patient meet any 2 criteria? No. Patient's initial sepsis screen is        

      negative. Does the patient have a suspected source of infection? No. Patient's initial      

      sepsis screen is negative. Risk Assessment: Do you want to hurt yourself or someone         

      else? Patient reports no desire to harm self or others. Onset of symptoms was 2023.                                                                                   

02:04 Method Of Arrival: Ambulatory                                                           cm10

02:04 Acuity: ARIELLE 4                                                                           cm10

                                                                                                  

Triage Assessment:                                                                                

02:06 General: Appears in no apparent distress. comfortable, Behavior is calm, cooperative.   cm10

      Pain: Complains of pain in throat. EENT: No deficits noted. Reports pain in throat when     

      swallowing. Neuro: No deficits noted. Level of Consciousness is awake, alert, obeys         

      commands, Oriented to person, place, time, situation. Cardiovascular: No deficits           

      noted. Patient's skin is warm and dry. Respiratory: No deficits noted. Airway is patent     

      Respiratory effort is even, unlabored, Respiratory pattern is regular, symmetrical. GI:     

      No deficits noted. No signs and/or symptoms were reported involving the                     

      gastrointestinal system. : No deficits noted. No signs and/or symptoms were reported      

      regarding the genitourinary system. Derm: No deficits noted. No signs and/or symptoms       

      reported regarding the dermatologic system. Skin is intact, Skin is pink, warm \T\ dry.     

      Musculoskeletal: No deficits noted. Range of motion: intact in all extremities.             

                                                                                                  

OB/GYN:                                                                                           

02:05 LMP 2023, Pregnancy unknown                                                        cm10

                                                                                                  

Historical:                                                                                       

- Allergies:                                                                                      

02:05 No Known Allergies;                                                                     cm10

- Home Meds:                                                                                      

02:05 None [Active];                                                                          cm10

- PMHx:                                                                                           

02:05 None;                                                                                   cm10

- PSHx:                                                                                           

02:05 None;                                                                                   cm10

                                                                                                  

- Immunization history:: Adult Immunizations unknown.                                             

- Social history:: Smoking status: Reported history of juuling and/or vaping. Patient             

  uses street drugs, marijuana.                                                                   

                                                                                                  

                                                                                                  

Screenin:07 [facility] ED Fall Risk Assessment (Adult) History of falling in the last 3 months,       cm10

      including since admission No falls in past 3 months (0 pts) Confusion or Disorientation     

      Yes (5 pts) Intoxicated or Sedated No (0 pts) Impaired Gait No (0 pts) Mobility Assist      

      Device Used No (0 pt) Altered Elimination No (0 pt) Score/Fall Risk Level 0 - 2 = Low       

      Risk Oriented to surroundings, Maintained a safe environment, Hourly rounding (assess       

      needs \T\ fall precautionary measures) done. Abuse screen: Denies threats or abuse.         

      Denies injuries from another. Nutritional screening: No deficits noted. Tuberculosis        

      screening: No symptoms or risk factors identified.                                          

                                                                                                  

Assessment:                                                                                       

02:15 General: see triage assessment.                                                         jw7 

03:22 Reassessment: Patient appears in no apparent distress at this time. Patient and/or      jw7 

      family updated on plan of care and expected duration. Pain level reassessed. Patient is     

      alert, oriented x 3, equal unlabored respirations, skin warm/dry/pink.                      

                                                                                                  

Vital Signs:                                                                                      

02:04  / 73; Pulse 81; Resp 16 S; Temp 99.4(O); Pulse Ox 99% on R/A; Weight 96.62 kg    cm10

      (R); Height 5 ft. 7 in. (R); Pain 4/10;                                                     

02:04 Body Mass Index 33.36 (96.62 kg, 170.18 cm)                                             cm10

02:04 Pain Scale: Adult                                                                       cm10

                                                                                                  

ED Course:                                                                                        

01:59 Patient arrived in ED.                                                                  gm2 

02:00 Senait Dwyer FNP-C is PHCP.                                                        kb  

02:00 Jose Rivera MD is Attending Physician.                                             kb  

02:05 Triage completed.                                                                       cm10

02:06 Arm band placed on Patient placed in an exam room, on a stretcher.                      cm10

02:07 Patient has correct armband on for positive identification. Provided Education on: ER   cm10

      process and procedures. .                                                                   

02:07 No provider procedures requiring assistance completed. Strep swab sent to lab. Patient  cm10

      did not have IV access during this emergency room visit.                                    

02:09 Daija De La Vega, RN is Primary Nurse.                                                       [email or web address] 

                                                                                                  

Administered Medications:                                                                         

No medications were administered                                                                  

                                                                                                  

                                                                                                  

Medication:                                                                                       

02:07 VIS not applicable for this client.                                                     cm10

                                                                                                  

Outcome:                                                                                          

02:53 Discharge ordered by MD. crawfrod  

03:24 Discharged to home ambulatory,                                                          [email or web address] 

03:24 Condition: stable                                                                           

03:24 Discharge instructions given to patient, Instructed on discharge instructions, follow       

      up and referral plans. Demonstrated understanding of instructions, follow-up care,          

03:25 Patient left the ED.                                                                    jw7 

                                                                                                  

Signatures:                                                                                       

Senait Dwyer, FNP-C                 ADRIANNAP-Daija Garcia RN                         RN   jw7                                                  

Eulalia Wyatt RN                  RN   10                                                 

Nguyen Moe                             [facility]                                                  

                                                                                                  

**************************************************************************************************

## 2025-02-12 ENCOUNTER — HOSPITAL ENCOUNTER (EMERGENCY)
Dept: HOSPITAL 97 - ER | Age: 25
LOS: 1 days | Discharge: HOME | End: 2025-02-13
Payer: SELF-PAY

## 2025-02-12 DIAGNOSIS — S16.1XXA: Primary | ICD-10-CM

## 2025-02-12 PROCEDURE — 87070 CULTURE OTHR SPECIMN AEROBIC: CPT

## 2025-02-12 PROCEDURE — 99283 EMERGENCY DEPT VISIT LOW MDM: CPT

## 2025-02-12 PROCEDURE — 87081 CULTURE SCREEN ONLY: CPT

## 2025-02-13 VITALS — DIASTOLIC BLOOD PRESSURE: 67 MMHG | SYSTOLIC BLOOD PRESSURE: 125 MMHG | OXYGEN SATURATION: 98 %

## 2025-02-13 VITALS — TEMPERATURE: 98.6 F

## 2025-02-13 NOTE — EDPHYS
Physician Documentation                                                                           

 [facility]                                                                 

Name: Vivian Hull                                                                             

Age: 24 yrs                                                                                       

Sex: Female                                                                                       

: 2000                                                                                   

MRN: [id number]                                                                                   

Arrival Date: 2025                                                                          

Time: 23:00                                                                                       

Account#: [id number]                                                                            

Bed IW2                                                                                           

Private MD:                                                                                       

ED Physician Blade Ospina                                                                    

HPI:                                                                                              

                                                                                             

23:39 This 24 yrs old Black Female presents to ER via Ambulatory with complaints of Stiff     kb  

      Neck, Neck and Upper Back Pain.                                                             

23:39 pt is a 24 year old female who presents for pain to left side of neck that started 3    kb  

      days ago. States the pain started at work where she does a lot of lifting and twisting.     

      Denies any other injury. Denies radiation of pain. States the pain has been getting         

      increasingly worse. Pain aggravated by movement.                                            

                                                                                                  

OB/GYN:                                                                                           

23:11 LMP 1/15/2025, Pregnancy unknown                                                        me1 

                                                                                                  

Historical:                                                                                       

- Allergies:                                                                                      

23:11 No Known Allergies;                                                                     me1 

- Home Meds:                                                                                      

23:11 None [Active];                                                                          me1 

- PMHx:                                                                                           

23:11 None;                                                                                   me1 

- PSHx:                                                                                           

23:11 None;                                                                                   me1 

                                                                                                  

- Immunization history:: Adult Immunizations up to date.                                          

- Infectious Disease History:: Denies.                                                            

- Social history:: Smoking status: Reported history of juuling and/or vaping.                     

                                                                                                  

                                                                                                  

ROS:                                                                                              

23:12 Constitutional: As per HPI                                                              kb  

                                                                                                  

Exam:                                                                                             

23:12 Constitutional:  This is a well developed, well nourished patient who is awake, alert,  kb  

      and in no acute distress. Head/Face:  Normocephalic, atraumatic. Cardiovascular:            

      Regular rate  Respiratory:  Respirations even and unlabored. No increased work of           

      breathing. Talking in full sentences Skin:  Warm, dry with normal turgor.  Normal           

      color. MS/ Extremity:  Pulses equal, no cyanosis.  Neurovascular intact.  Full, normal      

      range of motion. Neuro:  Awake and alert, GCS 15, oriented to person, place, time, and      

      situation.                                                                                  

23:12 ENT: Posterior pharynx: Tonsils: bilaterally enlarged, with erythema, erythema, that is     

      mild,                                                                                       

23:12 Neck: External neck: tenderness, that is moderate, of the  left posterior aspect of         

      neck, C-spine: appears grossly normal, ROM/movement: pain, that is moderate, with any       

      movement, nuchal rigidity, is not appreciated,                                              

                                                                                                  

Vital Signs:                                                                                      

23:08  / 97; Pulse 71; Resp 17; Temp 98.6; Pulse Ox 100% ; Weight 95.25 kg; Height 5    me1 

      ft. 7 in. ; Pain 9/10;                                                                      

                                                                                             

00:35  / 67; Pulse 71; Resp 18 S; Pulse Ox 98% on R/A;                                  ha1 

                                                                                             

23:08 Body Mass Index 32.89 (95.25 kg, 170.18 cm)                                             me1 

                                                                                             

23:08 Pain Scale: Adult                                                                       me 

                                                                                                  

MDM:                                                                                              

                                                                                             

23:05 Medical Screening Exam initiated                                                        kb  

23:39 Differential diagnosis: strain, torticollis. Data reviewed: vital signs, nurses notes.  kb  

      Counseling: I had a detailed discussion with the patient and/or guardian regarding the      

      historical points, exam findings, and any diagnostic results supporting the                 

      discharge/admit diagnosis, lab results, the need for outpatient follow up, a family         

      practitioner, to return to the emergency department if symptoms worsen or persist or if     

      there are any questions or concerns that arise at home.                                     

23:45 Test considered but Not performed: CT: ct considered but pt has no neuro deficits, no   kb  

      vertebral tenderness.                                                                       

                                                                                                  

                                                                                             

23:12 Order name: Strep                                                                       kb  

                                                                                             

00:14 Order name: Throat Culture                                                              EDMS

                                                                                                  

Administered Medications:                                                                         

23:14 CANCELLED (Physician Discretion): hydrocodone-acetaminophen5 mg-325 mg 1 tabs PO once   kb  

23:19 Drug: predniSONE PO 40 mg PO once Route: PO;                                            me1 

                                                                                             

00:38 Follow up: Response: No adverse reaction; Marked relief of symptoms                     ha1 

                                                                                             

23:19 Drug: Diazepam PO 5 mg PO once Route: PO;                                               me1 

                                                                                             

00:38 Follow up: Response: No adverse reaction; No change in condition; Pain is decreased     1 

                                                                                             

23:20 Drug: Ibuprofen  mg PO once Route: PO;                                            [facility] [place] 

                                                                                             

00:38 Follow up: Response: No adverse reaction; Marked relief of symptoms; Pain is decreased  ha1 

                                                                                                  

                                                                                                  

Disposition:                                                                                      

22:21 Co-signature as Attending Physician, Blade Ospina MD I agree with the assessment    sp4 

      and plan of care. I reviewed the patient's care provided by the [facility]           

      Provider and agree with the diagnosis and treatment plan.                                   

                                                                                                  

Disposition Summary:                                                                              

25 00:12                                                                                    

Discharge Ordered                                                                                 

 Notes:       Location: Home                                                                        
  kb

      Condition: Stable                                                                       kb  

      Diagnosis                                                                                   

        - Strain of muscle, fascia and tendon at neck level                                   kb  

      Followup:                                                                               kb  

        - With: Emergency Department                                                               

        - When: As needed                                                                          

        - Reason: Worsening of condition                                                           

      Followup:                                                                               kb  

        - With: Private Physician                                                                  

        - When: 2 - 3 days                                                                         

        - Reason: Recheck today's complaints, Continuance of care, Re-evaluation by your           

      physician                                                                                   

      Discharge Instructions:                                                                     

        - Discharge Summary Sheet                                                             kb  

        - Muscle Strain, Easy-to-Read                                                         kb  

      Forms:                                                                                      

        - Medication Reconciliation Form                                                      kb  

        - Antibiotic Education                                                                kb  

        - Prescription Opioid Use                                                             kb  

        - Patient Portal Instructions                                                         kb  

        - Leadership Thank You Letter                                                         kb  

        - Work release form                                                                   ha1 

      Prescriptions:                                                                              

        - Ibuprofen 600 mg Oral Tablet                                                             

            - take 1 tablet ORAL route every 6 hours As needed take with food; 30 tablet;     kb  

      Refills: 0, Product Selection Permitted                                                     

        - orphenadrine citrate 100 mg Oral Tablet Sustained Release                                

            - take 1 tablet ORAL route 2 times per day As needed; 20 tablet; Refills: 0,      kb  

      Product Selection Permitted                                                                 

Signatures:                                                                                       

Dispatcher [employer]                           EDSenait Mejia, REY-C                 FNP-Blade Rooney MD MD   sp4                                                  

Hanane Thomas RN                  RN   me1                                                  

Gracia Ashley RN   ha1                                                  

                                                                                                  

Corrections: (The following items were deleted from the chart)                                    

                                                                                             

23:14 23:12 HYDROcodone-acetaminophen PO 5 mg-325 mg 1 tabs PO once ordered. [facility]  

                                                                                                  

**************************************************************************************************

## 2025-02-13 NOTE — ER
Nurse's Notes                                                                                     

 [facility]                                                                 

Name: Vivian Hull                                                                             

Age: 24 yrs                                                                                       

Sex: Female                                                                                       

: 2000                                                                                   

MRN: [id number]                                                                                   

Arrival Date: 2025                                                                          

Time: 23:00                                                                                       

Account#: [id number]                                                                            

Bed IW2                                                                                           

Private MD:                                                                                       

Diagnosis: Strain of muscle, fascia and tendon at neck level                                      

                                                                                                  

Presentation:                                                                                     

                                                                                             

23:08 Chief complaint: Patient states: c/o left neck and upper back pain for 3 days that is   me1 

      worse today. c/o pain with swallowing. Coronavirus screen: Vaccine status:. Coronavirus     

      screen: Vaccine status: Patient reports being unvaccinated. Ebola Screen: No symptoms       

      or risks identified at this time. Acute neurological deficit: none identified. Initial      

      Sepsis Screen: Does the patient meet any 2 criteria? No. Patient's initial sepsis           

      screen is negative. Does the patient have a suspected source of infection? No.              

      Patient's initial sepsis screen is negative. Risk Assessment: Do you want to hurt           

      yourself or someone else? Patient reports no desire to harm self or others. Onset of        

      symptoms was 2025.                                                             

23:08 Method Of Arrival: Ambulatory                                                           [facility] 

23:08 Acuity: ARIELLE 4                                                                           me1 

                                                                                                  

Triage Assessment:                                                                                

                                                                                             

00:36 General: Appears comfortable, Behavior is. Pain: Complains of pain in left posterior    ha1 

      aspect of neck Pain currently is 5 out of 10 on a pain scale. Neuro: Level of               

      Consciousness is awake, alert, obeys commands, Oriented to person, place, time,             

      situation. Cardiovascular: Capillary refill < 3 seconds Patient's skin is warm and dry.     

      Respiratory: Airway is patent Respiratory effort is even, unlabored, Respiratory            

      pattern is regular, symmetrical.                                                            

                                                                                                  

OB/GYN:                                                                                           

                                                                                             

23:11 LMP 1/15/2025, Pregnancy unknown                                                        me1 

                                                                                                  

Historical:                                                                                       

- Allergies:                                                                                      

23:11 No Known Allergies;                                                                     me1 

- Home Meds:                                                                                      

23:11 None [Active];                                                                          me1 

- PMHx:                                                                                           

23:11 None;                                                                                   me1 

- PSHx:                                                                                           

23:11 None;                                                                                   me1 

                                                                                                  

- Immunization history:: Adult Immunizations up to date.                                          

- Infectious Disease History:: Denies.                                                            

- Social history:: Smoking status: Reported history of juuling and/or vaping.                     

                                                                                                  

                                                                                                  

Screenin/13                                                                                             

00:36 [facility] ED Fall Risk Assessment (Adult) History of falling in the last 3 months,       ha1 

      including since admission No falls in past 3 months (0 pts) Confusion or Disorientation     

      No (0 pts) Intoxicated or Sedated No (0 pts) Impaired Gait No (0 pts) Mobility Assist       

      Device Used No (0 pt) Altered Elimination No (0 pt) Score/Fall Risk Level 0 - 2 = Low       

      Risk Oriented to surroundings, Maintained a safe environment, Educated pt \T\ family on     

      fall prevention, incl call for assistance when getting out of bed, Hourly rounding          

      (assess needs \T\ fall precautionary measures) done. Abuse screen: Denies threats or        

      abuse. Denies injuries from another. Nutritional screening: No deficits noted.              

      Tuberculosis screening: No symptoms or risk factors identified.                             

                                                                                                  

Assessment:                                                                                       

00:35 Reassessment: Patient and/or family updated on plan of care and expected duration. Pain ha1 

      level reassessed. Patient is alert, oriented x 3, equal unlabored respirations, skin        

      warm/dry/pink. Patient states symptoms have improved.                                       

                                                                                                  

Vital Signs:                                                                                      

                                                                                             

23:08  / 97; Pulse 71; Resp 17; Temp 98.6; Pulse Ox 100% ; Weight 95.25 kg; Height 5    me1 

      ft. 7 in. ; Pain 9/10;                                                                      

                                                                                             

00:35  / 67; Pulse 71; Resp 18 S; Pulse Ox 98% on R/A;                                  ha1 

                                                                                             

23:08 Body Mass Index 32.89 (95.25 kg, 170.18 cm)                                             me1 

                                                                                             

23:08 Pain Scale: Adult                                                                       me1 

                                                                                                  

ED Course:                                                                                        

                                                                                             

23:01 Patient arrived in ED.                                                                  jj6 

23:05 [facility]                                                        kb  

23:05 Blade Ospina MD is Attending Physician.                                           kb  

23:11 Triage completed.                                                                       me1 

23:11 Arm band placed on Patient placed in waiting room.                                      me1 

                                                                                             

00:37 Patient has correct armband on for positive identification. Provided Education on:      ha1 

      follow ups .                                                                                

00:37 No provider procedures requiring assistance completed. Patient did not have IV access   ha1 

      during this emergency room visit.                                                           

                                                                                                  

Administered Medications:                                                                         

                                                                                             

23:14 CANCELLED (Physician Discretion): hydrocodone-acetaminophen5 mg-325 mg 1 tabs PO once   kb  

23:19 Drug: predniSONE PO 40 mg PO once Route: PO;                                            me1 

                                                                                             

00:38 Follow up: Response: No adverse reaction; Marked relief of symptoms                     ha1 

                                                                                             

23:19 Drug: Diazepam PO 5 mg PO once Route: PO;                                               me1 

                                                                                             

00:38 Follow up: Response: No adverse reaction; No change in condition; Pain is decreased     1 

                                                                                             

23:20 Drug: Ibuprofen  mg PO once Route: PO;                                            me1 

                                                                                             

00:38 Follow up: Response: No adverse reaction; Marked relief of symptoms; Pain is decreased  [facility] 

                                                                                                  

                                                                                                  

Medication:                                                                                       

00:37 VIS not applicable for this client.                                                     ha1 

                                                                                                  

Outcome:                                                                                          

00:12 Discharge ordered by MD.                                                                ty  

00:37 Discharged to home ambulatory,                                                          ha1 

00:37 Condition: stable                                                                           

00:37 Discharge instructions given to patient, Instructed on discharge instructions, follow       

      up and referral plans. medication usage, Demonstrated understanding of instructions,        

      follow-up care, medications, Prescriptions given X 2,                                       

00:38 Patient left the ED.                                                                    ha1 

                                                                                                  

Signatures:                                                                                       

Senait Dwyer, ADRIANNAP-C                 FNP-Rosemary Patterson                           jj6                                                  

Gracia Ashley, RN                        RN   ha1                                                  

Hanane Thomas RN                  RN   me1                                                  

                                                                                                  

**************************************************************************************************